# Patient Record
Sex: MALE | Race: WHITE | NOT HISPANIC OR LATINO | URBAN - METROPOLITAN AREA
[De-identification: names, ages, dates, MRNs, and addresses within clinical notes are randomized per-mention and may not be internally consistent; named-entity substitution may affect disease eponyms.]

---

## 2023-07-26 ENCOUNTER — OFFICE VISIT (OUTPATIENT)
Dept: PRIMARY CARE | Facility: CLINIC | Age: 28
End: 2023-07-26
Payer: COMMERCIAL

## 2023-07-26 ENCOUNTER — LAB (OUTPATIENT)
Dept: LAB | Facility: LAB | Age: 28
End: 2023-07-26
Payer: COMMERCIAL

## 2023-07-26 VITALS
DIASTOLIC BLOOD PRESSURE: 58 MMHG | WEIGHT: 154 LBS | BODY MASS INDEX: 20.86 KG/M2 | HEART RATE: 61 BPM | HEIGHT: 72 IN | SYSTOLIC BLOOD PRESSURE: 98 MMHG

## 2023-07-26 DIAGNOSIS — M77.11 LATERAL EPICONDYLITIS OF RIGHT ELBOW: ICD-10-CM

## 2023-07-26 DIAGNOSIS — Z00.00 HEALTH CARE MAINTENANCE: Primary | ICD-10-CM

## 2023-07-26 DIAGNOSIS — Z00.00 HEALTH CARE MAINTENANCE: ICD-10-CM

## 2023-07-26 LAB
ALANINE AMINOTRANSFERASE (SGPT) (U/L) IN SER/PLAS: 14 U/L (ref 10–52)
ALBUMIN (G/DL) IN SER/PLAS: 5.2 G/DL (ref 3.4–5)
ALKALINE PHOSPHATASE (U/L) IN SER/PLAS: 67 U/L (ref 33–120)
ANION GAP IN SER/PLAS: 13 MMOL/L (ref 10–20)
ASPARTATE AMINOTRANSFERASE (SGOT) (U/L) IN SER/PLAS: 18 U/L (ref 9–39)
BILIRUBIN TOTAL (MG/DL) IN SER/PLAS: 0.9 MG/DL (ref 0–1.2)
CALCIUM (MG/DL) IN SER/PLAS: 10 MG/DL (ref 8.6–10.6)
CARBON DIOXIDE, TOTAL (MMOL/L) IN SER/PLAS: 26 MMOL/L (ref 21–32)
CHLORIDE (MMOL/L) IN SER/PLAS: 104 MMOL/L (ref 98–107)
CHOLESTEROL (MG/DL) IN SER/PLAS: 203 MG/DL (ref 0–199)
CHOLESTEROL IN HDL (MG/DL) IN SER/PLAS: 57.2 MG/DL
CHOLESTEROL/HDL RATIO: 3.5
CREATININE (MG/DL) IN SER/PLAS: 0.93 MG/DL (ref 0.5–1.3)
ERYTHROCYTE DISTRIBUTION WIDTH (RATIO) BY AUTOMATED COUNT: 11.5 % (ref 11.5–14.5)
ERYTHROCYTE MEAN CORPUSCULAR HEMOGLOBIN CONCENTRATION (G/DL) BY AUTOMATED: 36 G/DL (ref 32–36)
ERYTHROCYTE MEAN CORPUSCULAR VOLUME (FL) BY AUTOMATED COUNT: 90 FL (ref 80–100)
ERYTHROCYTES (10*6/UL) IN BLOOD BY AUTOMATED COUNT: 4.84 X10E12/L (ref 4.5–5.9)
GFR MALE: >90 ML/MIN/1.73M2
GLUCOSE (MG/DL) IN SER/PLAS: 104 MG/DL (ref 74–99)
HEMATOCRIT (%) IN BLOOD BY AUTOMATED COUNT: 43.6 % (ref 41–52)
HEMOGLOBIN (G/DL) IN BLOOD: 15.7 G/DL (ref 13.5–17.5)
HEPATITIS B VIRUS SURFACE AB (MIU/ML) IN SERUM: 48.8 MIU/ML
HEPATITIS C VIRUS AB PRESENCE IN SERUM: NONREACTIVE
HIV 1/ 2 AG/AB SCREEN: NONREACTIVE
LDL: 117 MG/DL (ref 0–99)
LEUKOCYTES (10*3/UL) IN BLOOD BY AUTOMATED COUNT: 5.6 X10E9/L (ref 4.4–11.3)
NRBC (PER 100 WBCS) BY AUTOMATED COUNT: 0 /100 WBC (ref 0–0)
PLATELETS (10*3/UL) IN BLOOD AUTOMATED COUNT: 212 X10E9/L (ref 150–450)
POTASSIUM (MMOL/L) IN SER/PLAS: 4.1 MMOL/L (ref 3.5–5.3)
PROTEIN TOTAL: 7.6 G/DL (ref 6.4–8.2)
SODIUM (MMOL/L) IN SER/PLAS: 139 MMOL/L (ref 136–145)
SYPHILIS TOTAL AB: NONREACTIVE
TRIGLYCERIDE (MG/DL) IN SER/PLAS: 145 MG/DL (ref 0–149)
UREA NITROGEN (MG/DL) IN SER/PLAS: 14 MG/DL (ref 6–23)
VLDL: 29 MG/DL (ref 0–40)

## 2023-07-26 PROCEDURE — 36415 COLL VENOUS BLD VENIPUNCTURE: CPT

## 2023-07-26 PROCEDURE — 80053 COMPREHEN METABOLIC PANEL: CPT

## 2023-07-26 PROCEDURE — 99203 OFFICE O/P NEW LOW 30 MIN: CPT | Performed by: INTERNAL MEDICINE

## 2023-07-26 PROCEDURE — 85027 COMPLETE CBC AUTOMATED: CPT

## 2023-07-26 PROCEDURE — 80061 LIPID PANEL: CPT

## 2023-07-26 PROCEDURE — 86803 HEPATITIS C AB TEST: CPT

## 2023-07-26 PROCEDURE — 87389 HIV-1 AG W/HIV-1&-2 AB AG IA: CPT

## 2023-07-26 PROCEDURE — 86780 TREPONEMA PALLIDUM: CPT

## 2023-07-26 PROCEDURE — 1036F TOBACCO NON-USER: CPT | Performed by: INTERNAL MEDICINE

## 2023-07-26 PROCEDURE — 86706 HEP B SURFACE ANTIBODY: CPT

## 2023-07-26 PROCEDURE — 87591 N.GONORRHOEAE DNA AMP PROB: CPT

## 2023-07-26 PROCEDURE — 87491 CHLMYD TRACH DNA AMP PROBE: CPT

## 2023-07-26 ASSESSMENT — PATIENT HEALTH QUESTIONNAIRE - PHQ9
2. FEELING DOWN, DEPRESSED OR HOPELESS: NOT AT ALL
1. LITTLE INTEREST OR PLEASURE IN DOING THINGS: NOT AT ALL
SUM OF ALL RESPONSES TO PHQ9 QUESTIONS 1 AND 2: 0

## 2023-07-26 NOTE — PROGRESS NOTES
"Subjective   Patient ID: Joss Naranjo is a 28 y.o. male who presents for Establish Care.    HPI     Patient is a 28-year-old male with no significant past medical history presents with chief complaint of right elbow pain.  Patient works at a computer all day.  He is right-handed dominant.  He states that the pain is worse when he rotates his elbow clockwise.  Symptom onset was 3 weeks ago.  He has been using ibuprofen without significant relief.  There is no numbness or tingling of the fingers.  No recent trauma or injury.    Review of Systems  Constitutional: No fever or chills  Cardiovascular: no chest pain, no palpitations and no syncope.   Respiratory: no cough, no shortness of breath during exertion and no shortness of breath at rest.   Gastrointestinal: no abdominal pain, no nausea and no vomiting.  Neuro: No Headache, no dizziness  MSK right elbow pain    Objective   BP 98/58   Pulse 61   Ht 1.829 m (6')   Wt 69.9 kg (154 lb)   BMI 20.89 kg/m²     Physical Exam  Constitutional: Alert and in no acute distress. Well developed, well nourished  Head and Face: Head and face: Normal.    Cardiovascular: Heart rate and rhythm were normal, normal S1 and S2. No peripheral edema.   Pulmonary: No respiratory distress. Clear bilateral breath sounds.  Musculoskeletal: Gait and station: Normal. Muscle strength/tone: Normal.   Skin: Normal skin color and pigmentation, normal skin turgor, and no rash.    Psychiatric: Judgment and insight: Intact. Mood and affect: Normal.  MSK tenderness to tapping of the lateral epicondyles        No results found for: \"WBC\", \"HGB\", \"HCT\", \"PLT\", \"CHOL\", \"TRIG\", \"HDL\", \"LDLDIRECT\", \"ALT\", \"AST\", \"NA\", \"K\", \"CL\", \"CREATININE\", \"BUN\", \"CO2\", \"TSH\", \"PSA\", \"INR\", \"GLUF\", \"HGBA1C\", \"ALBUR\"    ANKLE  MRN: [id number]  Patient Name: ANMOL NARANJO     STUDY:  Right ANKLE, COMPLETE, MIN 3 VIEWS;  6/21/2021 4:15 pm     INDICATION:  pain at medial malleolus.     COMPARISON:  None.     ACCESSION " NUMBER(S):  [id number]     ORDERING CLINICIAN:  DYLAN ANDERSON     FINDINGS:  There is no evidence of acute fracture or dislocation.     Ankle mortise is preserved.     No soft tissue swelling is seen.     No evidence for radiopaque foreign body.     IMPRESSION:  Normal exam of the ankle.               Assessment/Plan   Problem List Items Addressed This Visit          Musculoskeletal and Injuries    Lateral epicondylitis of right elbow     Patient has evidence of lateral epicondylitis.  We will provide rehabilitation exercises.  Recommend Voltaren gel twice daily.  Avoid exacerbating movements.  Recommend an elbow brace.  Consider occupational therapy if no improvement in 1 month    Check screening labs and STD check             Other Visit Diagnoses       Health care maintenance    -  Primary    Relevant Orders    CBC    Comprehensive metabolic panel    Lipid Panel    C. trachomatis / N. gonorrhoeae, DNA probe    HIV 1/2 Antigen/Antibody Screen with Reflex to Confirmation    Hepatitis B surface antibody    Hepatitis C Antibody    Syphilis Screen with Reflex

## 2023-07-27 LAB
CHLAMYDIA TRACH., AMPLIFIED: NEGATIVE
N. GONORRHEA, AMPLIFIED: NEGATIVE

## 2023-07-28 NOTE — RESULT ENCOUNTER NOTE
Your blood counts including hemoglobin and white blood counts are within normal limits  your cholesterol is slightly above goal.  Recommend regular exercise and Mediterranean diet.  Your kidney and liver function is stable  Your hepatitis syphilis and HIV tests were all negative  keep up the good work.    Dr Bazzi

## 2023-09-06 ENCOUNTER — PATIENT MESSAGE (OUTPATIENT)
Dept: PRIMARY CARE | Facility: CLINIC | Age: 28
End: 2023-09-06
Payer: COMMERCIAL

## 2023-09-06 DIAGNOSIS — M77.11 LATERAL EPICONDYLITIS OF RIGHT ELBOW: Primary | ICD-10-CM

## 2023-10-12 PROBLEM — K21.9 CHRONIC GERD: Status: ACTIVE | Noted: 2023-10-12

## 2023-10-12 PROBLEM — G43.909 MIGRAINES: Status: ACTIVE | Noted: 2023-10-12

## 2023-10-12 PROBLEM — M77.51 TENDONITIS OF ANKLE, RIGHT: Status: ACTIVE | Noted: 2023-10-12

## 2023-10-12 PROBLEM — M76.61 ACHILLES TENDINITIS OF RIGHT LOWER EXTREMITY: Status: ACTIVE | Noted: 2023-10-12

## 2023-10-12 RX ORDER — PHENYLPROPANOLAMINE/CLEMASTINE 75-1.34MG
200 TABLET, EXTENDED RELEASE ORAL
COMMUNITY

## 2023-10-12 RX ORDER — COVID-19 MOLECULAR TEST ASSAY
KIT MISCELLANEOUS
COMMUNITY
Start: 2023-03-19

## 2023-10-13 ENCOUNTER — TREATMENT (OUTPATIENT)
Dept: OCCUPATIONAL THERAPY | Facility: CLINIC | Age: 28
End: 2023-10-13
Payer: COMMERCIAL

## 2023-10-13 DIAGNOSIS — M25.521 RIGHT ELBOW PAIN: Primary | ICD-10-CM

## 2023-10-13 DIAGNOSIS — M77.11 LATERAL EPICONDYLITIS OF RIGHT ELBOW: ICD-10-CM

## 2023-10-13 PROCEDURE — 97035 APP MDLTY 1+ULTRASOUND EA 15: CPT | Mod: GO

## 2023-10-13 PROCEDURE — 97110 THERAPEUTIC EXERCISES: CPT | Mod: GO

## 2023-10-13 PROCEDURE — 97010 HOT OR COLD PACKS THERAPY: CPT | Mod: GO

## 2023-10-13 PROCEDURE — 97140 MANUAL THERAPY 1/> REGIONS: CPT | Mod: GO

## 2023-10-13 ASSESSMENT — PAIN - FUNCTIONAL ASSESSMENT: PAIN_FUNCTIONAL_ASSESSMENT: 0-10

## 2023-10-13 ASSESSMENT — PAIN SCALES - GENERAL: PAINLEVEL_OUTOF10: 0 - NO PAIN

## 2023-10-13 NOTE — PROGRESS NOTES
"Occupational Therapy    Occupational Therapy Treatment    Name: Joss Naranjo  MRN: [id number]  : 1995  Date: 10/13/23  Time Calculation  Start Time: 0800  Stop Time: 0845  Time Calculation (min): 45 min  Visit 3  Assessment:   Patient reports noticing a significant improvement in overall R elbow pain. Reports not always wearing the R elbow brace on the weekends. Patient's Quick Dash score decreased from 36.36% to 9.09%. Patient was in agreement to for discharge today, and to continue with HEP as needed.  Plan:   No further treatment indicated. Discharge patient.     Subjective   Patient agreeable to treatment session. \"I have noticed an improvement in my elbow pain.\"    Pain Assessment:  Pain Assessment  Pain Assessment: 0-10  Pain Score: 0 - No pain    Objective    Modalities: timed minutes 8, untimed minutes 5 .   heat applied to R elbow before exercises. skin checks before and after application    Ultrasound (27802) X8min continuous 3MHZ @1.0w/cm2 over lateral aspect of R elbow.     Manual Therapy (70083): timed minutes 10 .   Manual massage over R lateral aspect of elbow and dorsal aspect of R forearm.     Therapeutic exercise (98132): timed minutes 20 .   Eccentric loading with 2# hand weight for the R forearm 2 sets of 10  R : 110#    Completed shoulder stabilizer strengthening exercises in prone, 2 sets of 10:  shoulder extension  horizontal abduction  \"W\" retraction      Outcome Measures:  OT Adult Other Outcome Measures  Other Outcome Measures: Quick Dash (9.09%)    OP EDUCATION:  Patient educated on continuing with HEP as needed.    Goals:  Active       OT Goals       Goal 1       Start:  10/13/23    Expected End:  10/13/23       HEP: Patient will complete HEP independently. GOAL MET  Pain: Patient will report a decrease in R elbow/forearm pain during typing and other work related tasks. GOAL MET  Motor Function/Control/Tone:   , Patient will have a 10% decrease on Quick Dash to demonstrate " increased functional mobility of the RUE  GOAL MET

## 2024-01-09 ENCOUNTER — ANCILLARY PROCEDURE (OUTPATIENT)
Dept: RADIOLOGY | Facility: CLINIC | Age: 29
End: 2024-01-09
Payer: COMMERCIAL

## 2024-01-09 ENCOUNTER — OFFICE VISIT (OUTPATIENT)
Dept: PRIMARY CARE | Facility: CLINIC | Age: 29
End: 2024-01-09
Payer: COMMERCIAL

## 2024-01-09 VITALS
SYSTOLIC BLOOD PRESSURE: 95 MMHG | BODY MASS INDEX: 21.29 KG/M2 | WEIGHT: 157 LBS | HEART RATE: 58 BPM | DIASTOLIC BLOOD PRESSURE: 56 MMHG

## 2024-01-09 DIAGNOSIS — R07.81 RIB PAIN: ICD-10-CM

## 2024-01-09 DIAGNOSIS — H61.20 IMPACTED CERUMEN, UNSPECIFIED LATERALITY: ICD-10-CM

## 2024-01-09 DIAGNOSIS — R07.81 RIB PAIN: Primary | ICD-10-CM

## 2024-01-09 PROCEDURE — 99213 OFFICE O/P EST LOW 20 MIN: CPT | Performed by: INTERNAL MEDICINE

## 2024-01-09 PROCEDURE — 1036F TOBACCO NON-USER: CPT | Performed by: INTERNAL MEDICINE

## 2024-01-09 PROCEDURE — 71101 X-RAY EXAM UNILAT RIBS/CHEST: CPT | Mod: RIGHT SIDE | Performed by: RADIOLOGY

## 2024-01-09 PROCEDURE — 71101 X-RAY EXAM UNILAT RIBS/CHEST: CPT | Mod: RT

## 2024-01-09 ASSESSMENT — PAIN SCALES - GENERAL: PAINLEVEL: 0-NO PAIN

## 2024-01-09 NOTE — PROGRESS NOTES
"Patient was identified as a fall risk. Risk prevention instructions provided.Subjective   Patient ID: Reina Naranjo \"Joss\" is a 28 y.o. male who presents for Follow-up and Earache.    HPI     Patient is a 28-year-old male who presents with chief complaint of anterior chest pain on the right.  He states that he is playing pickle ball.  Is been ongoing for 2 weeks.  He is concerned he might have a fracture of the anterior ribs.  Pain is dull and achy and 5 out of 10 in intensity worse with deep inspiration    Patient had an episode of otitis externa.  [facility] ne[facility] eardrops.  He states he still having some pain in the right ear.  No fevers or chills and no drainage    Review of Systems  Constitutional: No fever or chills  Cardiovascular: no chest pain, no palpitations and no syncope.   Respiratory: no cough, no shortness of breath during exertion and no shortness of breath at rest.   Gastrointestinal: no abdominal pain, no nausea and no vomiting.  Neuro: No Headache, no dizziness  ENT ear pain    Objective   BP 95/56   Pulse 58   Wt 71.2 kg (157 lb)   BMI 21.29 kg/m²     Physical Exam  Constitutional: Alert and in no acute distress. Well developed, well nourished  Head and Face: Head and face: Normal.    Cardiovascular: Heart rate and rhythm were normal, normal S1 and S2. No peripheral edema.   Pulmonary: No respiratory distress. Clear bilateral breath sounds.  Musculoskeletal: Gait and station: Normal. Muscle strength/tone: Normal.   Skin: Normal skin color and pigmentation, normal skin turgor, and no rash.    Psychiatric: Judgment and insight: Intact. Mood and affect: Normal.  ENT there is some cerumen in the external canal on the right partially obstructing the tympanic membrane        Lab Results   Component Value Date    WBC 5.6 07/26/2023    HGB 15.7 07/26/2023    HCT 43.6 07/26/2023     07/26/2023    CHOL 203 (H) 07/26/2023    TRIG 145 07/26/2023    HDL " 57.2 07/26/2023    ALT 14 07/26/2023    AST 18 07/26/2023     07/26/2023    K 4.1 07/26/2023     07/26/2023    CREATININE 0.93 07/26/2023    BUN 14 07/26/2023    CO2 26 07/26/2023       ANKLE  MRN: [id number]  Patient Name: ANMOL ARSHAD     STUDY:  Right ANKLE, COMPLETE, MIN 3 VIEWS;  6/21/2021 4:15 pm     INDICATION:  pain at medial malleolus.     COMPARISON:  None.     ACCESSION NUMBER(S):  [id number]     ORDERING CLINICIAN:  DYLAN ANDERSON     FINDINGS:  There is no evidence of acute fracture or dislocation.     Ankle mortise is preserved.     No soft tissue swelling is seen.     No evidence for radiopaque foreign body.     IMPRESSION:  Normal exam of the ankle.               Assessment/Plan   Problem List Items Addressed This Visit             ICD-10-CM    Rib pain - Primary R07.81     Continue ibuprofen and icing area.  Will check rib imaging to rule out fracture         Relevant Orders    XR ribs right 2 views w chest pa or ap    Impacted cerumen H61.20     Unable to remove cerumen with irrigation.  As such we will refer to ENT for removal  Start Debrox 3 times a day         Relevant Medications    carbamide peroxide (Debrox) 6.5 % otic solution    Other Relevant Orders    Referral to ENT

## 2024-01-09 NOTE — ASSESSMENT & PLAN NOTE
Unable to remove cerumen with irrigation.  As such we will refer to ENT for removal  Start Debrox 3 times a day

## 2024-02-21 ENCOUNTER — OFFICE VISIT (OUTPATIENT)
Dept: OTOLARYNGOLOGY | Facility: CLINIC | Age: 29
End: 2024-02-21
Payer: COMMERCIAL

## 2024-02-21 VITALS — WEIGHT: 150 LBS | BODY MASS INDEX: 20.34 KG/M2

## 2024-02-21 DIAGNOSIS — R04.0 EPISTAXIS, RECURRENT: ICD-10-CM

## 2024-02-21 DIAGNOSIS — H61.20 IMPACTED CERUMEN, UNSPECIFIED LATERALITY: ICD-10-CM

## 2024-02-21 PROCEDURE — 1036F TOBACCO NON-USER: CPT | Performed by: GENERAL PRACTICE

## 2024-02-21 PROCEDURE — 99203 OFFICE O/P NEW LOW 30 MIN: CPT | Performed by: GENERAL PRACTICE

## 2024-02-21 RX ORDER — MUPIROCIN 20 MG/G
OINTMENT TOPICAL 2 TIMES DAILY
Qty: 22 G | Refills: 2 | Status: SHIPPED | OUTPATIENT
Start: 2024-02-21 | End: 2024-03-06

## 2024-02-21 NOTE — PROGRESS NOTES
Otolaryngology - Head and Neck Surgery Outpatient New Patient Visit Note        Assessment/Plan   Problem List Items Addressed This Visit             ICD-10-CM       ENT    Impacted cerumen H61.20     Other Visit Diagnoses         Codes    Epistaxis, recurrent     R04.0    Relevant Medications    mupirocin (Bactroban) 2 % ointment            28yoM with recent otitis  with desquamated TM layer on exam, but no ongoing otitis.  Tinnitus relieved with debridement.    Also with dry rhinitis and a history of recurrent epistaxis.  Discussed use of mupirocin for 2 weeks then saline gel.  Discussed proper conservative management for epistaxis.       NOSE CARE and NOSEBLEED PREVENTION  - Do not stick anything in your nose other than the medicine as noted below. DO NOT pick your nose as this will cause the bleeding  - Avoid any nose blowing, sneeze with your mouth open, avoid any maneuvers that increase the blood pressure in your head (such as straining on the toilet) and keep your head above your heart as much as possible until otherwise directed.  - Begin Mupirocin ointment 3 times daily FOR 2-4 WEEKS as directed. Use the pads of your fingers to apply the ointment and then sniff back gently. Do not use a cue tip or finger nail to place the ointment as this can cause further trauma. IF THE PRESCRIBED OINTMENT IS TOO EXPENSIVE THEN JUST USE OVER THE COUNTER BACITRACIN OR TRIPLE ANTIBIOTIC OINTMENT.  - We recommended the patient use a humidifier in the bedroom and in the house.  - After 2 weeks start using nasal saline gel (Ayr nasal gel) at least 3 times per day. Alternatively, you can also use Vaseline three times daily. You can also use a saline nasal spray (Ocean nasal spray) 4-6 times daily. These are all over the counter medications  - We discussed nosebleed prevention and acute treatment - Afrin or oxymetazoline spray, 2 sprays in each nostril for bleeding, apply pressure for 10 minutes across the soft part of the nose  "(pinch your nostrils together). If bleeding occurs reapply for another 10 minutes. If this doesn't work then call our office or go to the Emergency Department.         Follow up:  -plan for follow up in clinic as needed    All of the above findings, impressions, treatment planning and follow up plans were discussed with the patient who indicated understanding.  the patient was instructed to contact or return to clinic sooner if symptoms/signs persist or worsen despite the above management.      Landon Alvares MD  Otolaryngology - Head and Neck Surgery            History Of Present Illness  Reina Naranjo \"Joss\" is a 28 y.o. male presenting for ear fullness after recent otitis.    Reports ear pain, fullness and ear wetness from Nov to Dec2023 associated with URI intially.  Treated with cortisporin drops which exacerbated symptoms.  Later with use of debrox with some relief of fullness.     No ongoing otalgia or otorrhea, but notes R>L ear fullness and some tinnitus.      No significant prior history fo recurrent otitis or ear trauma/surgery.        Notes some chronic congestion, dry irritatino in nose and recurrent epistaxis.    No signfiicant allergic rhinitis or recurrent sinusitis.    No nasal medications.  Controls epistaxis with pressure, lasts minutes.             Past Medical History  He has no past medical history on file.    Surgical History  He has no past surgical history on file.     Social History  He reports that he has never smoked. He has never been exposed to tobacco smoke. He has never used smokeless tobacco. He reports current alcohol use. He reports that he does not use drugs.    Family History  No family history on file.     Allergies  Patient has no known allergies.    Review of Systems  ROS: Pertinent positives as noted in HPI.    - CONSTITUTIONAL: Does not report weight loss, fever or chills.    - HEENT:   Ear: Does not report  , vertigo, hearing loss, otalgia, otorrhea  Nose: Does not report  " , rhinorrhea,  , decreased smell  Throat: Does not report pain, dysphagia, odynophagia  Larynx: Does not report hoarseness,  difficulty breathing, pain with speaking (odynophonia)  Neck: Does not report new masses, pain, swelling  Face: Does not report sinus pain, pressure, swelling, numbness, weakness     - RESPIRATORY: Does not report SOB or cough.    - CV: Does not report palpitations or chest pain.     - GI: Does not report abdominal pain, nausea, vomiting or diarrhea.    - : Does not report dysuria or urinary frequency.    - MSK: Does not report myalgia or joint pain.    - SKIN: Does not report rash or pruritus.    - NEUROLOGICAL: Does not report headache or syncope.    - PSYCHIATRIC: Does not report recent changes in mood. Does not report anxiety or depression.         Physical Exam:     GENERAL:   Alert & Oriented to person, place and time; Normal affect and appearance. Well developed and well nourished. Conversant & cooperative with examination.     HEAD:   Normocephalic, atraumatic. No sinus tenderness to palpation. Normal parotid bilaterally. Normal facial strength.     NEUROLOGIC:   Cranial nerves II-XII grossly intact, gait WNL. Normal mood and affect.    EYES:   Extraocular movements intact. Pupils equal, round, reactive to light and accommodation. No nystagmus, no ptosis. no scleral injection.    EAR:   Normal auricle. No discomfort or TTP with manipulation.   Handheld otoscopic exam showed normal external auditory canals bilaterally. No purulence or EAC inflammation. Minimal cerumen.  Desquamated layer of TM partially adherent to TM R>L ,removed with forceps.    Right tympanic membrane clear and mobile without evidence of perforation, retraction or middle ear effusion.   Left tympanic membrane clear and mobile without evidence of perforation, retraction or middle ear effusion.     NOSE:   No external deformity. No external nasal lesions, lacerations, or scars. Nasal tip symmetrical with normal nasal  valves.   Nasal cavity with essentially midline septum, dry/excoriated septal mucosal with small scabbing L>R.  Otherwise normal mucosa and turbinates. No lesions, masses, purulence or polyps.     OC/OP:   Mucous membranes moist, no masses, lesions or exudates.   Normal tongue, floor of mouth, teeth, gums, lips. Normal posterior pharyngeal wall.    Normal tonsils without erythema, exudate or obvious calculi     NECK:   No neck masses or thyroid enlargement. Trachea midline. No tenderness to palpation    LYMPHATIC:   No cervical lymphadenopathy.     RESPIRATORY:   Symmetric chest elevation & no retractions. No significant hoarseness. No increased work of breathing.    CV:   No clubbing or cyanosis. No obvious edema    Skin:   No facial rashes, vesicles or lesions.     Extremities:   No gross abnormalities      Clinic Procedure    Binocular microscopy exam  Indication: tympanic membrane(s) could not be visualized adequately with handheld otoscopy.   Location:  bilateral ears  Visualization Instrument: A microscope was used to visualize through a speculum placed in the ear canal(s) to visualize the ear canal, tympanic membranes and to assist in assessment and removal of debris.  Findings:  see physical exam documentation  Patient Status: The patient tolerated the procedure well.   Complications: There were no complications.     Information review:  External sources (notes, imaging, lab results) listed below personally reviewed to aid in medical decision making process.  -  -  -